# Patient Record
Sex: MALE | Race: WHITE
[De-identification: names, ages, dates, MRNs, and addresses within clinical notes are randomized per-mention and may not be internally consistent; named-entity substitution may affect disease eponyms.]

---

## 2018-02-28 ENCOUNTER — HOSPITAL ENCOUNTER (OUTPATIENT)
Dept: HOSPITAL 76 - DI | Age: 58
Discharge: HOME | End: 2018-02-28
Attending: FAMILY MEDICINE
Payer: COMMERCIAL

## 2018-02-28 DIAGNOSIS — R60.0: Primary | ICD-10-CM

## 2018-02-28 PROCEDURE — 76857 US EXAM PELVIC LIMITED: CPT

## 2018-02-28 NOTE — ULTRASOUND REPORT
LIMITED PELVIC ULTRASOUND:  02/28/2018

 

CLINICAL INDICATION:  Palpable abnormality left inguinal region.

 

TECHNIQUE:  Real-time scanning was performed with representative static images obtained.

 

FINDINGS:  Ultrasound of the palpable abnormality identified by the patient was performed.

 

At this site, subcutaneous edema is noted.  No drainable fluid collection or solid mass is 

seen.  There are normal sized inguinal nodes in the region, which do not correlate with the 

palpable abnormality.

 

IMPRESSION:  SUBCUTANEOUS EDEMA IN THE REGION OF THE PALPABLE ABNORMALITY, WITHOUT EVIDENCE OF 

A DRAINABLE ABSCESS.

 

 

 

DD: 02/28/2018 13:53

TD: 02/28/2018 14:08

Job #: [id number]

## 2018-03-08 ENCOUNTER — HOSPITAL ENCOUNTER (OUTPATIENT)
Dept: HOSPITAL 76 - DI | Age: 58
Discharge: HOME | End: 2018-03-08
Attending: FAMILY MEDICINE
Payer: COMMERCIAL

## 2018-03-08 ENCOUNTER — HOSPITAL ENCOUNTER (OUTPATIENT)
Dept: HOSPITAL 76 - LAB | Age: 58
Discharge: HOME | End: 2018-03-08
Attending: FAMILY MEDICINE
Payer: COMMERCIAL

## 2018-03-08 DIAGNOSIS — R59.0: Primary | ICD-10-CM

## 2018-03-08 DIAGNOSIS — R19.04: Primary | ICD-10-CM

## 2018-03-08 DIAGNOSIS — R59.0: ICD-10-CM

## 2018-03-08 LAB
ANION GAP SERPL CALCULATED.4IONS-SCNC: 11 MMOL/L (ref 6–13)
BUN SERPL-MCNC: 15 MG/DL (ref 6–20)
CALCIUM UR-MCNC: 9.7 MG/DL (ref 8.5–10.3)
CHLORIDE SERPL-SCNC: 98 MMOL/L (ref 101–111)
CO2 SERPL-SCNC: 27 MMOL/L (ref 21–32)
CREAT SERPLBLD-SCNC: 0.9 MG/DL (ref 0.6–1.2)
GFRSERPLBLD MDRD-ARVRAT: 88 ML/MIN/{1.73_M2} (ref 89–?)
GLUCOSE SERPL-MCNC: 87 MG/DL (ref 70–100)
SODIUM SERPLBLD-SCNC: 136 MMOL/L (ref 135–145)

## 2018-03-08 PROCEDURE — 80048 BASIC METABOLIC PNL TOTAL CA: CPT

## 2018-03-08 PROCEDURE — 72193 CT PELVIS W/DYE: CPT

## 2018-03-08 PROCEDURE — 36415 COLL VENOUS BLD VENIPUNCTURE: CPT

## 2018-03-08 NOTE — CT REPORT
EXAM:

CT PELVIS

 

EXAM DATE: 3/8/2018 04:05 PM.

 

CLINICAL HISTORY: Left inguinal swelling.

 

COMPARISONS: None.

 

TECHNIQUE: Routine helical CT imaging was performed through the pelvis. IV contrast: ISOVUE 300  100m
L. Enteric contrast: No. Reconstructions: Coronal and sagittal.

 

In accordance with CT protocol optimization, one or more of the following dose reduction techniques w
ere utilized for this exam: automated exposure control, adjustment of mA and/or KV based on patient s
ize, or use of iterative reconstructive technique.

 

FINDINGS: 

Visualized Abdominal Organs: Normal.

 

Peritoneal Cavity/Bowel: Normal. No free fluid, free air or adenopathy. No masses or acute inflammato
ry process. The appendix is well visualized and normal.

 

Pelvic Organs: Normal. The bladder, rectum, and visualized pelvic organs are within normal limits.

 

Vasculature: No aneurysms or other significant abnormality.

 

Bones: No significant abnormality.

 

Other: No inguinal lymphadenopathy, fluid collection, or mass. There may be subtle skin thickening on
 the left compared to the right as demonstrated on axial image 42.

 

IMPRESSION: Subtle skin thickening on the left. Otherwise negative study.

 

[facility]

Referring Provider Line: [phone]

 

SITE ID: [id number]

## 2021-03-11 ENCOUNTER — HOSPITAL ENCOUNTER (OUTPATIENT)
Dept: HOSPITAL 76 - LAB | Age: 61
Discharge: HOME | End: 2021-03-11
Attending: PHYSICIAN ASSISTANT
Payer: COMMERCIAL

## 2021-03-11 ENCOUNTER — HOSPITAL ENCOUNTER (OUTPATIENT)
Dept: HOSPITAL 76 - DI | Age: 61
Discharge: HOME | End: 2021-03-11
Attending: PHYSICIAN ASSISTANT
Payer: COMMERCIAL

## 2021-03-11 DIAGNOSIS — S60.042A: ICD-10-CM

## 2021-03-11 DIAGNOSIS — D48.5: Primary | ICD-10-CM

## 2021-03-11 DIAGNOSIS — L29.8: Primary | ICD-10-CM

## 2021-03-11 DIAGNOSIS — D48.5: ICD-10-CM

## 2021-03-11 DIAGNOSIS — L29.8: ICD-10-CM

## 2021-03-11 LAB
ALBUMIN DIAFP-MCNC: 4.8 G/DL (ref 3.2–5.5)
ALP SERPL-CCNC: 78 IU/L (ref 42–121)
ALT SERPL W P-5'-P-CCNC: 23 IU/L (ref 10–60)
ANION GAP SERPL CALCULATED.4IONS-SCNC: 12 MMOL/L (ref 6–13)
AST SERPL W P-5'-P-CCNC: 24 IU/L (ref 10–42)
BASOPHILS NFR BLD AUTO: 0 10^3/UL (ref 0–0.1)
BASOPHILS NFR BLD AUTO: 0.4 %
BILIRUB BLD-MCNC: 0.4 MG/DL (ref 0.2–1)
BILIRUB DIRECT SERPL-MCNC: 0.1 MG/DL (ref 0.1–0.5)
BUN SERPL-MCNC: 18 MG/DL (ref 6–20)
CALCIUM UR-MCNC: 9.2 MG/DL (ref 8.5–10.3)
CHLORIDE SERPL-SCNC: 100 MMOL/L (ref 101–111)
CO2 SERPL-SCNC: 24 MMOL/L (ref 21–32)
CREAT SERPLBLD-SCNC: 1 MG/DL (ref 0.6–1.2)
EOSINOPHIL # BLD AUTO: 0.3 10^3/UL (ref 0–0.7)
EOSINOPHIL NFR BLD AUTO: 3.1 %
ERYTHROCYTE [DISTWIDTH] IN BLOOD BY AUTOMATED COUNT: 11.7 % (ref 12–15)
GFRSERPLBLD MDRD-ARVRAT: 77 ML/MIN/{1.73_M2} (ref 89–?)
GLOBULIN SER-MCNC: 2.9 G/DL (ref 2.1–4.2)
GLUCOSE SERPL-MCNC: 112 MG/DL (ref 70–100)
HCT VFR BLD AUTO: 41.8 % (ref 42–52)
HGB UR QL STRIP: 14.3 G/DL (ref 14–18)
LYMPHOCYTES # SPEC AUTO: 1.7 10^3/UL (ref 1.5–3.5)
LYMPHOCYTES NFR BLD AUTO: 16.6 %
MCH RBC QN AUTO: 30.4 PG (ref 27–31)
MCHC RBC AUTO-ENTMCNC: 34.2 G/DL (ref 32–36)
MCV RBC AUTO: 88.9 FL (ref 80–94)
MONOCYTES # BLD AUTO: 0.6 10^3/UL (ref 0–1)
MONOCYTES NFR BLD AUTO: 5.5 %
NEUTROPHILS # BLD AUTO: 7.4 10^3/UL (ref 1.5–6.6)
NEUTROPHILS # SNV AUTO: 10 X10^3/UL (ref 4.8–10.8)
NEUTROPHILS NFR BLD AUTO: 74 %
NRBC # BLD AUTO: 0 /100WBC
NRBC # BLD AUTO: 0 X10^3/UL
PDW BLD AUTO: 9.9 FL (ref 7.4–11.4)
PLATELET # BLD: 266 10^3/UL (ref 130–450)
POTASSIUM SERPL-SCNC: 3.6 MMOL/L (ref 3.5–5)
PROT SPEC-MCNC: 7.7 G/DL (ref 6.7–8.2)
RBC MAR: 4.7 10^6/UL (ref 4.7–6.1)
SODIUM SERPLBLD-SCNC: 136 MMOL/L (ref 135–145)
T4 FREE SERPL-MCNC: 0.93 NG/DL (ref 0.58–1.64)
TSH SERPL-ACNC: 1.58 UIU/ML (ref 0.34–5.6)

## 2021-03-11 PROCEDURE — 84630 ASSAY OF ZINC: CPT

## 2021-03-11 PROCEDURE — 80076 HEPATIC FUNCTION PANEL: CPT

## 2021-03-11 PROCEDURE — 84439 ASSAY OF FREE THYROXINE: CPT

## 2021-03-11 PROCEDURE — 86038 ANTINUCLEAR ANTIBODIES: CPT

## 2021-03-11 PROCEDURE — 82652 VIT D 1 25-DIHYDROXY: CPT

## 2021-03-11 PROCEDURE — 85025 COMPLETE CBC W/AUTO DIFF WBC: CPT

## 2021-03-11 PROCEDURE — 36415 COLL VENOUS BLD VENIPUNCTURE: CPT

## 2021-03-11 PROCEDURE — 80048 BASIC METABOLIC PNL TOTAL CA: CPT

## 2021-03-11 PROCEDURE — 84443 ASSAY THYROID STIM HORMONE: CPT

## 2021-03-11 NOTE — XRAY REPORT
PROCEDURE:  Chest 2 View X-Ray

 

INDICATIONS:   PRURITUS

 

TECHNIQUE:  2 view(s) of the chest.  

 

COMPARISON:  None

 

FINDINGS:  

 

Surgical changes and devices:  None.  

 

Lungs and pleura:  No pleural effusions or pneumothorax.  Lungs are clear.  

 

Mediastinum:  Mediastinal contours are normal.  Heart size is normal.  

 

Bones and chest wall:  No suspicious bony abnormalities.  Soft tissues appear unremarkable.  

 

IMPRESSION:  No acute cardiopulmonary disease.

 

Reviewed by: JOSELUIS Pettit on 3/11/2021 5:29 PM PST

Approved by: Celeste Machuca MD on 3/11/2021 5:29 PM PST

 

 

Station ID:  [id number]

## 2021-03-13 LAB — ANA SER QL: NEGATIVE

## 2021-09-04 ENCOUNTER — HOSPITAL ENCOUNTER (OUTPATIENT)
Dept: HOSPITAL 76 - LAB.S | Age: 61
Discharge: HOME | End: 2021-09-04
Attending: PHYSICIAN ASSISTANT
Payer: COMMERCIAL

## 2021-09-04 DIAGNOSIS — M70.22: Primary | ICD-10-CM

## 2021-09-04 LAB
CLARITY FLD: (no result)
COLOR FLD: (no result)
EOSINOPHIL NFR FLD: 9 %
LYMPHOCYTES NFR FLD: 47 %
MONOCYTES NFR FLD: 4 %
NEUTROPHILS NFR FLD MANUAL: 40 %
RBC # FLD: (no result) /MM^3
SPECIMEN SOURCE FLD: (no result)
WBC OTHER NFR FLD: 1389 /MM^3

## 2021-09-04 PROCEDURE — 89051 BODY FLUID CELL COUNT: CPT

## 2021-09-04 PROCEDURE — 87070 CULTURE OTHR SPECIMN AEROBIC: CPT

## 2021-09-04 PROCEDURE — 87205 SMEAR GRAM STAIN: CPT

## 2022-08-03 ENCOUNTER — HOSPITAL ENCOUNTER (EMERGENCY)
Dept: HOSPITAL 76 - ED | Age: 62
Discharge: HOME | End: 2022-08-03
Payer: COMMERCIAL

## 2022-08-03 VITALS — SYSTOLIC BLOOD PRESSURE: 153 MMHG | DIASTOLIC BLOOD PRESSURE: 89 MMHG
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
## 2022-08-03 DIAGNOSIS — S06.0X0A: ICD-10-CM

## 2022-08-03 DIAGNOSIS — S01.21XA: Primary | ICD-10-CM

## 2022-08-03 PROCEDURE — 12013 RPR F/E/E/N/L/M 2.6-5.0 CM: CPT

## 2022-08-03 PROCEDURE — 90471 IMMUNIZATION ADMIN: CPT

## 2022-08-03 PROCEDURE — 99282 EMERGENCY DEPT VISIT SF MDM: CPT

## 2022-08-03 PROCEDURE — 70450 CT HEAD/BRAIN W/O DYE: CPT

## 2022-08-03 PROCEDURE — 99284 EMERGENCY DEPT VISIT MOD MDM: CPT

## 2022-08-03 PROCEDURE — 90715 TDAP VACCINE 7 YRS/> IM: CPT

## 2022-08-03 PROCEDURE — 70486 CT MAXILLOFACIAL W/O DYE: CPT

## 2022-08-03 PROCEDURE — 72125 CT NECK SPINE W/O DYE: CPT

## 2022-08-03 NOTE — CT REPORT
PROCEDURE:  MAXILLOFACIAL WO

 

INDICATIONS:  NAIL GUN FELL ON FACE 30 FT

 

TECHNIQUE:  

Noncontrast 1.5 mm thick axial images acquired from the mandible through the frontal sinuses, with co
maribell and sagittal reformatting.  For radiation dose reduction, the following was used:  automated ex
posure control, adjustment of mA and/or kV according to patient size.

 

COMPARISON:  Correlation is made with the accompanying head CT and cervical spine CT, 8/3/2022.

 

FINDINGS:  

Image quality:  Excellent.  

 

Bones and teeth:  Orbital walls are intact.  Sinus walls show no fracture or deformity.  Nasal bones 
and septum are intact.  Visualized portions of the mandible demonstrate no fractures or subluxation. 
 Zygomatic arches are intact.  Pterygoid plates are intact.  Visualized portions of the skull base an
d auditory canals are intact.  

 

Sinuses:  Mild mucosal thickening is seen within the paranasal sinuses. Bilateral ambreen bullosa are 
incidentally noted.

 

Soft tissues: There is a mild right forehead hematoma seen.

 

Vascular:  Visualized vascular structures appear normal in the absence of contrast.  Bony vascular fo
ramina and canals are intact.  

 

 

 

 

IMPRESSION:  

 

There is a mild right-sided forehead scalp hematoma seen, without an associated fracture.

 

Reviewed by: Danilo Peace MD on 8/3/2022 5:19 PM PDT

Approved by: Danilo Peace MD on 8/3/2022 5:19 PM PDT

 

 

Station ID:  [id number]

## 2022-08-03 NOTE — CT REPORT
PROCEDURE:  CERVICAL SPINE WO

 

INDICATIONS:  NAIL GUN FELL ON FACE 30 FT

 

TECHNIQUE:  

Noncontrast 3 mm thick sections acquired from the skull base to the T4 level.  Sagittal and coronal r
eformats were then constructed.  For radiation dose reduction, the following was used:  automated exp
osure control, adjustment of mA and/or kV according to patient size.

 

COMPARISON:  Correlation is made with the accompanying maxillofacial CT and head CT, 8/3/2022.

 

FINDINGS:  

Image quality:  Excellent.  

 

Bones:  No fractures or dislocations.  Visualized superior ribs are intact.  

 

There is moderate disc space narrowing at C5-C6 and moderate to severe disc space narrowing at C6-C7.
 Endplate irregularity and sclerosis are seen, which are worst at C6-C7. Posterior directed endplate 
osteophytes are seen at C6-C7.

 

Soft tissues:  Prevertebral soft tissues are normal in thickness.  No paravertebral hematomas.  No ap
ical pneumothoraces.  

 

 

 

 

IMPRESSION:  

 

Negative for cervical spine fracture.

 

Cervical spine degenerative changes are seen, which are worst at C6-C7.

 

Reviewed by: Danilo Peace MD on 8/3/2022 5:21 PM PDT

Approved by: Danilo Peace MD on 8/3/2022 5:21 PM PDT

 

 

Station ID:  [id number]

## 2022-08-03 NOTE — ED PHYSICIAN DOCUMENTATION
PD HPI HEAD INJURY





- Stated complaint


Stated Complaint: NOSE AND HEAD INJ





- Chief complaint


Chief Complaint: Trauma Hd/Nk





- History obtained from


History obtained from: Patient





- History of Present Illness


Timing - onset: How many minutes ago (30)





- Additional information


Additional information: 





62-year-old male presents for evaluation of a head injury that occurred 

approximately 30 minutes prior to arrival.  Patient was doing construction 

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

the face.  Denies use of blood thinners, denies loss of consciousness.  Endorses

nose pain and swelling over his forehead where the nail gun struck his face.  

Patient states that he feels somewhat foggy and nauseous, otherwise denies neck 

pain, blurred vision, other complaints at this time.





Review of Systems


Ten Systems: 10 systems reviewed and negative


Constitutional: denies: Fever, Chills, Myalgias


Eyes: denies: Loss of vision, Decreased vision, Photophobia, Discharge, 

Irritation


Ears: denies: Loss of hearing, Ear pain, Drainage/discharge, Tinnitus/ringing, 

Foreign body


Nose: reports: Other (nose pain).  denies: Rhinorrhea / runny nose, Congestion, 

Foreign Body


Cardiac: denies: Chest pain / pressure, Palpitations, Calf pain


Respiratory: denies: Dyspnea, Cough, Wheezing


GI: reports: Nausea.  denies: Abdominal Pain, Abdominal Swelling, Vomiting


Skin: reports: Other (facial swelling)


Musculoskeletal: denies: Neck pain, Back pain, Extremity pain





PD PAST MEDICAL HISTORY





- Past Medical History


Past Medical History: Yes


Endocrine/Autoimmune: None


Musculoskeletal: None





- Past Surgical History


Past Surgical History: Yes





- Present Medications


Home Medications: 


                                Ambulatory Orders











 Medication  Instructions  Recorded  Confirmed


 


Levetiracetam [Keppra] 500 mg 05/20/16 


 


Naproxen 500 mg PO BID PRN #20 tablet 08/03/22 


 


methocarbamoL [Robaxin] 500 mg PO Q6H #20 tablet 08/03/22 














- Allergies


Allergies/Adverse Reactions: 


                                    Allergies











Allergy/AdvReac Type Severity Reaction Status Date / Time


 


No Known Drug Allergies Allergy   Verified 08/03/22 16:08














- Social History


Does the pt smoke?: No


Smoking Status: Never smoker


Does the pt drink ETOH?: Yes


Does the pt have substance abuse?: No





- Immunizations


Immunizations are current?: Yes





PD ED PE NORMAL





- Vitals


Vital signs reviewed: Yes





- General


General: Alert and oriented X 3, No acute distress, Well developed/nourished





- HEENT


HEENT: PERRL, EOMI, Ears normal, Pharynx benign, Other (nasal swelling. No 

septal hematoma, no active bleeding)





- Neck


Neck: Supple, no meningeal sign, No bony TTP, No JVD





- Cardiac


Cardiac: No murmur, Strong equal pulses, Other (bradycardia)





- Respiratory


Respiratory: No respiratory distress, Clear bilaterally





- Abdomen


Abdomen: Soft, Non tender, Non distended





- Back


Back: No CVA TTP





- Derm


Derm: Normal color, Other (contusion R forehead. 3cm curvilinear laceration tip 

of nose sparing nasal ala)





- Extremities


Extremities: No deformity, No tenderness to palpate, Normal ROM s pain, No edema





- Neuro


Neuro: Alert and oriented X 3, CNs 2-12 intact, No motor deficit, No sensory 

deficit, Normal speech





- Psych


Psych: Normal mood, Normal affect





Results





- Vitals


Vitals: 


                                     Oxygen











O2 Source                      Room air

















Procedures





- Laceration (location)


  ** Nose


Wound type: Curved, Clean


Neurovascular status: Sensory intact


Anesthesia: LET


Wound preparation: Irrigated copiously NS, Limited undermining


Skin layer closure: Prolene, Size #-0 - enter number (6), Sutures - enter # (4)


Other: Patient tolerated well, No complications, Tetanus booster given





PD MEDICAL DECISION MAKING





- ED course


Complexity details: reviewed results, re-evaluated patient, considered 

differential, d/w patient


ED course: 





Traumatic injury to face.  Patient reports nausea, however is alert and oriented

 x3.  Patient taken quickly to CT scanner.  Thankfully there was no traumatic 

injury seen on CT.  Patient did have a small laceration to the tip of his nose, 

this was repaired per the procedure note.  Tetanus shot was updated while in the

 department.  Patient likely does have some component of concussion given his 

symptoms and the fact that a heavy object fell on his head from great height.  

Patient and his wife were given concussion precautions and medications for pain 

at home.  Discharged to the care of his wife in stable condition





Departure





- Departure


Disposition: 01 Home, Self Care


Clinical Impression: 


 Concussion, Contusion, nose





Condition: Stable


Instructions:  ED Head Injury Closed


Prescriptions: 


Naproxen 500 mg PO BID PRN #20 tablet


 PRN Reason: Pain


methocarbamoL [Robaxin] 500 mg PO Q6H #20 tablet


Discharge Date/Time: 08/03/22 18:37

## 2022-08-03 NOTE — CT REPORT
PROCEDURE:  HEAD WO

 

INDICATIONS:  NAIL GUN FELL ON FACE 30 FT

 

TECHNIQUE:  

Noncontrast 4.5 mm thick angled axial sections acquired from the foramen magnum to the vertex.  For r
adiation dose reduction, the following was used:  automated exposure control, adjustment of mA and/or
 kV according to patient size.

 

COMPARISON:  Correlation is made with the accompanying maxillofacial CT and cervical spine CT, 8/3/20
22.

 

FINDINGS:  

Image quality:  Excellent.  

 

CSF spaces:  Basal cisterns are patent.  No extra-axial fluid collections.  Ventricles are normal in 
size and shape.  

 

Brain:  No midline shift.  No intracranial masses or hemorrhage.  Gray-white matter interface is norm
al.  

 

Skull and face: There is a mild right frontal scalp hematoma seen, without an associated fracture. Ca
lvarium and visualized facial bones are intact, without suspicious lesions.  

 

Sinuses: Mild mucosal thickening can be seen within the paranasal sinuses.

 

 

 

IMPRESSION:  

 

Mild right frontal scalp hematoma.

 

No associated fracture is seen.

 

No intracranial hemorrhage is seen.   

 

No significant intracranial abnormality is seen.  

 

Reviewed by: Danilo Peace MD on 8/3/2022 5:20 PM PDT

Approved by: Danilo Peace MD on 8/3/2022 5:20 PM PDT

 

 

Station ID:  [id number]

## 2023-08-22 ENCOUNTER — HOSPITAL ENCOUNTER (OUTPATIENT)
Dept: HOSPITAL 76 - EMS | Age: 63
End: 2023-08-22
Payer: COMMERCIAL

## 2023-08-22 DIAGNOSIS — Z04.1: Primary | ICD-10-CM
